# Patient Record
(demographics unavailable — no encounter records)

---

## 2024-11-11 NOTE — HISTORY OF PRESENT ILLNESS
[FreeTextEntry1] : Ludwig is a 1 yo M with L index finger injury, sustained 2 weeks ago. His finger was slammed in a door. Patient presented to  urgent care for evaluation, where XRs were performed, showing fracture.  Patient was placed into a finger splint. Since injury, pain has improved. No tylenol/motrin needed. No numbness/tingling. No recent illnesses/fevers.  The patient's HPI was reviewed thoroughly with patient and parent. The patient's parent has acted as an independent historian regarding the above information due to the unreliable nature of the history obtained from the patient.

## 2024-11-11 NOTE — PHYSICAL EXAM
[FreeTextEntry1] : General: healthy appearing, acting appropriate for age.  HEENT: NCAT, Normal conjunctiva Cardio: Appears well perfused, no peripheral edema, brisk cap refill.  Lungs: no obvious increased WOB, no audible wheeze heard without use of stethoscope.  Abdomen: not examined.  Skin: No visible rashes on exposed skin  L hand/ index finger:  No swelling or clinical deformity No ttp about the finger Full extension and flexion of the little finger at PIP and DIP.  Can easily bring fingers into a fist without digits scissoring.  NVI, SILT. Moving digits freely.  WWP distally, brisk cap refill

## 2024-11-11 NOTE — DATA REVIEWED
[de-identified] : XR L index finger 3 views obtained and independently reviewed in our office today: signs of healing of a fracture about the proximal phalanx of the L index finger. Alignment is acceptable.

## 2024-11-11 NOTE — ASSESSMENT
[FreeTextEntry1] : Ludwig is a 1 yo M with L index finger proximal phalanx fracture, sustained 2 weeks ago, healing well.   XR L index finger 3 views obtained and independently reviewed in our office today: signs of healing of a fracture about the proximal phalanx of the L index finger. Alignment is acceptable. Clinical exam reveals no clinical deformity, no swelling or pain, full range of motion. No further immobilization needed. Patient can use tylenol/motrin as needed for pain. Avoid high risk activities like trampoline/bounce house/playground for another 2 weeks, and then can participate in activity as tolerated. Patient can f/u as needed for persistent or new concerns.    Today's visit included obtaining the history from the child and parent, due to the child's age, the child could not be considered a reliable historian, requiring the parent to act as an independent historian. The condition, natural history, and prognosis were explained to the patient and family. The clinical findings and images were reviewed with the family. All questions answered. Family expressed understanding and agreement with the above.  I, Fany Aburto PA-C, acted as scribe and documented the above for Dr. Sims.

## 2024-11-11 NOTE — REASON FOR VISIT
[Follow Up] : a follow up visit [Parents] : parents [FreeTextEntry1] : Left index finger injury, sustained 2 weeks ago

## 2024-11-11 NOTE — DATA REVIEWED
[de-identified] : XR L index finger 3 views obtained and independently reviewed in our office today: signs of healing of a fracture about the proximal phalanx of the L index finger. Alignment is acceptable.

## 2024-11-11 NOTE — END OF VISIT
[FreeTextEntry3] :     Saw and examined patient; the above is an accurate documentation of my words and actions.   Brooke Sims MD [facility]opedic [facility]

## 2024-11-11 NOTE — ASSESSMENT
[FreeTextEntry1] : Ludwig is a 3 yo M with L index finger proximal phalanx fracture, sustained 2 weeks ago, healing well.   XR L index finger 3 views obtained and independently reviewed in our office today: signs of healing of a fracture about the proximal phalanx of the L index finger. Alignment is acceptable. Clinical exam reveals no clinical deformity, no swelling or pain, full range of motion. No further immobilization needed. Patient can use tylenol/motrin as needed for pain. Avoid high risk activities like trampoline/bounce house/playground for another 2 weeks, and then can participate in activity as tolerated. Patient can f/u as needed for persistent or new concerns.    Today's visit included obtaining the history from the child and parent, due to the child's age, the child could not be considered a reliable historian, requiring the parent to act as an independent historian. The condition, natural history, and prognosis were explained to the patient and family. The clinical findings and images were reviewed with the family. All questions answered. Family expressed understanding and agreement with the above.  I, Fany Aburto PA-C, acted as scribe and documented the above for Dr. Sims.

## 2025-01-28 NOTE — HISTORY OF PRESENT ILLNESS
[Parents] : parents [Normal] : Normal [Yes] : Patient goes to dentist yearly [Vitamin] : Primary Fluoride Source: Vitamin [No] : Not at  exposure [Water heater temperature set at <120 degrees F] : Water heater temperature set at <120 degrees F [Car seat in back seat] : Car seat in back seat [Carbon Monoxide Detectors] : Carbon monoxide detectors [Smoke Detectors] : Smoke detectors [Supervised play near cars and streets] : Supervised play near cars and streets [Up to date] : Up to date [NO] : No

## 2025-01-28 NOTE — PHYSICAL EXAM
[Alert] : alert [No Acute Distress] : no acute distress [Playful] : playful [Normocephalic] : normocephalic [Conjunctivae with no discharge] : conjunctivae with no discharge [PERRL] : PERRL [EOMI Bilateral] : EOMI bilateral [Auricles Well Formed] : auricles well formed [Clear Tympanic membranes with present light reflex and bony landmarks] : clear tympanic membranes with present light reflex and bony landmarks [No Discharge] : no discharge [Nares Patent] : nares patent [Pink Nasal Mucosa] : pink nasal mucosa [Palate Intact] : palate intact [Uvula Midline] : uvula midline [Nonerythematous Oropharynx] : nonerythematous oropharynx [No Caries] : no caries [Trachea Midline] : trachea midline [Supple, full passive range of motion] : supple, full passive range of motion [No Palpable Masses] : no palpable masses [Symmetric Chest Rise] : symmetric chest rise [Clear to Auscultation Bilaterally] : clear to auscultation bilaterally [Normoactive Precordium] : normoactive precordium [Regular Rate and Rhythm] : regular rate and rhythm [Normal S1, S2 present] : normal S1, S2 present [No Murmurs] : no murmurs [+2 Femoral Pulses] : +2 femoral pulses [Soft] : soft [NonTender] : non tender [Non Distended] : non distended [Normoactive Bowel Sounds] : normoactive bowel sounds [No Hepatomegaly] : no hepatomegaly [No Splenomegaly] : no splenomegaly [Merritt 1] : Merritt 1 [Circumcised] : circumcised [Central Urethral Opening] : central urethral opening [Testicles Descended Bilaterally] : testicles descended bilaterally [Patent] : patent [Normally Placed] : normally placed [No Abnormal Lymph Nodes Palpated] : no abnormal lymph nodes palpated [Symmetric Buttocks Creases] : symmetric buttocks creases [Symmetric Hip Rotation] : symmetric hip rotation [No Gait Asymmetry] : no gait asymmetry [No pain or deformities with palpation of bone, muscles, joints] : no pain or deformities with palpation of bone, muscles, joints [Normal Muscle Tone] : normal muscle tone [No Spinal Dimple] : no spinal dimple [NoTuft of Hair] : no tuft of hair [Straight] : straight [Cranial Nerves Grossly Intact] : cranial nerves grossly intact [No Rash or Lesions] : no rash or lesions [de-identified] : As previously

## 2025-01-28 NOTE — DISCUSSION/SUMMARY
[Normal Growth] : growth [Normal Development] : development [None] : No known medical problems [No Elimination Concerns] : elimination [No Feeding Concerns] : feeding [No Skin Concerns] : skin [Normal Sleep Pattern] : sleep [Family Routines] : family routines [Language Promotion and Communication] : language promotion and communication [Social Development] : social development [ Considerations] :  considerations [Safety] : safety [No Medications] : ~He/She~ is not on any medications [Mother] : mother [Father] : father [] : The components of the vaccine(s) to be administered today are listed in the plan of care. The disease(s) for which the vaccine(s) are intended to prevent and the risks have been discussed with the caretaker.  The risks are also included in the appropriate vaccination information statements which have been provided to the patient's caregiver.  The caregiver has given consent to vaccinate.